# Patient Record
Sex: FEMALE | Race: OTHER | Employment: UNEMPLOYED | URBAN - METROPOLITAN AREA
[De-identification: names, ages, dates, MRNs, and addresses within clinical notes are randomized per-mention and may not be internally consistent; named-entity substitution may affect disease eponyms.]

---

## 2023-11-01 ENCOUNTER — HOSPITAL ENCOUNTER (EMERGENCY)
Age: 88
Discharge: HOME | End: 2023-11-01
Attending: EMERGENCY MEDICINE
Payer: MEDICAID

## 2023-11-01 VITALS
OXYGEN SATURATION: 100 % | DIASTOLIC BLOOD PRESSURE: 75 MMHG | SYSTOLIC BLOOD PRESSURE: 145 MMHG | HEART RATE: 66 BPM | TEMPERATURE: 98 F | RESPIRATION RATE: 16 BRPM

## 2023-11-01 DIAGNOSIS — T85.598A FEEDING TUBE BLOCKED, INITIAL ENCOUNTER: Primary | ICD-10-CM

## 2023-11-01 PROCEDURE — 99284 EMERGENCY DEPT VISIT MOD MDM: CPT

## 2023-11-01 NOTE — ED PROVIDER NOTES
[facility] Emergency Department      Pt Name: Gustavo Hines  MRN: [id number]  [address] [facility] [place] 7/2/1929  Date of evaluation: 11/1/2023  Provider: Alina Grider MD  CHIEF COMPLAINT  Chief Complaint   Patient presents with    Feeding Tube Problem     Pt cc dobhoff clogged     HPI  Gustavo Hines is a 80 y.o. female who presents because of clogged feeding tube. She is at [facility] for rehabilitation. It was noted by staff to be clogged during the nighttime. She has an NG tube present to supplement her nutrition but is also able to feed by mouth. She is at the nursing home for rehabilitation after a hip fracture. She apparently has vascular dementia and has had hospice consultation as an outpatient. REVIEW OF SYSTEMS: See HPI for further details. Remainder of review of systems limited by patient ability to provide history. Nursing notes reviewed. PAST MEDICAL HISTORY  No past medical history on file. SURGICAL HISTORY  No past surgical history on file. MEDICATIONS:  No current facility-administered medications on file prior to encounter. No current outpatient medications on file prior to encounter. ALLERGIES  Patient has no known allergies. SOCIAL HISTORY:     IMMUNIZATIONS:    There is no immunization history on file for this patient. PHYSICAL EXAM  VITAL SIGNS:  Blood pressure (!) 127/41, pulse 66, resp. rate 16, SpO2 100 %.   Constitutional:  80 y.o. female who does not appear toxic, asleep  HENT:  Atraumatic, mucous membranes moist, NG tube in place  Eyes:   Conjunctiva clear, no icterus  Neck:  Supple, no signs of injury, good ROM  Thorax & Lungs:  Respiratory effort normal, clear  Abdomen:  Non distended, soft, NT  Back:  No deformity  Extremities:  No cyanosis, no edema  Skin:  Warm, dry  Neurologic:  drowsy, nonverbal    RESULTS / MDM:  RADIOLOGY:  None     ED COURSE:  nursing staff put clog zapper into NT tube  History from:  Patient daughter  Limitations to history:

## 2023-11-01 NOTE — ED NOTES
Report back to [facility]  Daughter at bedside  Report to transport company, pt safely transferred from ER bed to transport stretcher and taken to exit     Aziza Sunshine, GAURAV  11/01/23 1000

## 2023-11-06 ENCOUNTER — HOSPITAL ENCOUNTER (EMERGENCY)
Age: 88
Discharge: HOME | End: 2023-11-06
Payer: MEDICAID

## 2023-11-06 VITALS
OXYGEN SATURATION: 98 % | HEART RATE: 83 BPM | DIASTOLIC BLOOD PRESSURE: 52 MMHG | TEMPERATURE: 98.2 F | SYSTOLIC BLOOD PRESSURE: 147 MMHG | RESPIRATION RATE: 27 BRPM

## 2023-11-06 DIAGNOSIS — T85.598A CLOGGED FEEDING TUBE: Primary | ICD-10-CM

## 2023-11-06 PROCEDURE — 99283 EMERGENCY DEPT VISIT LOW MDM: CPT

## 2023-11-06 RX ORDER — ACETAMINOPHEN 325 MG/1
325 TABLET ORAL EVERY 6 HOURS PRN
COMMUNITY
Start: 2022-08-30

## 2023-11-06 RX ORDER — ASPIRIN 81 MG/1
81 TABLET, CHEWABLE ORAL
COMMUNITY
Start: 2023-10-31

## 2023-11-06 RX ORDER — AMOXICILLIN 250 MG
1 CAPSULE ORAL 2 TIMES DAILY
COMMUNITY
Start: 2023-10-30

## 2023-11-06 RX ORDER — AMLODIPINE BESYLATE 5 MG/1
5 TABLET ORAL DAILY
COMMUNITY
Start: 2023-07-17

## 2023-11-06 RX ORDER — ATORVASTATIN CALCIUM 10 MG/1
5 TABLET, FILM COATED ORAL NIGHTLY
COMMUNITY
Start: 2023-10-30

## 2023-11-06 RX ORDER — MODAFINIL 100 MG/1
TABLET ORAL
COMMUNITY
Start: 2023-10-30 | End: 2023-11-27

## 2023-11-06 RX ORDER — METHOCARBAMOL 750 MG/1
750 TABLET, FILM COATED ORAL EVERY 6 HOURS PRN
COMMUNITY
Start: 2014-09-17

## 2023-11-06 RX ORDER — FAMOTIDINE 20 MG/1
20 TABLET, FILM COATED ORAL 2 TIMES DAILY
COMMUNITY
Start: 2023-10-30

## 2023-11-06 RX ORDER — IBUPROFEN 400 MG/1
400 TABLET ORAL 4 TIMES DAILY PRN
COMMUNITY
Start: 2018-05-02

## 2023-11-06 RX ORDER — HYDROCODONE BITARTRATE AND ACETAMINOPHEN 5; 325 MG/1; MG/1
1-2 TABLET ORAL EVERY 6 HOURS PRN
COMMUNITY
Start: 2014-09-17

## 2023-11-06 NOTE — ED NOTES
Clog buster instilled in Pt's NG tube, family member at bedside identified self as daughter stated that she was the POA and if clog buster did not work that she wanted the NG tube removed. Reynold HESTER notified of situation and also spoke with family at bedside.        Rhea Bowden RN  11/06/23 2974

## 2023-11-07 NOTE — ED PROVIDER NOTES
their note for interpretation of EKG. RADIOLOGY:   Non-plain film images such as CT, Ultrasound and MRI are read by the radiologist. Plain radiographic images are visualized and preliminarily interpreted by the ED Provider with the below findings:      Interpretation per the Radiologist below, if available at the time of this note:    No orders to display     No results found. No results found. PROCEDURES   Unless otherwise noted below, none     Procedures    CRITICAL CARE TIME (.cctime)       PAST MEDICAL HISTORY      has no past medical history on file. EMERGENCY DEPARTMENT COURSE and DIFFERENTIAL DIAGNOSIS/MDM:   Vitals:    Vitals:    11/06/23 1830 11/06/23 1845 11/06/23 1900 11/06/23 1915   BP: (!) 158/60 (!) 150/50 (!) 151/70 (!) 160/59   Pulse: 82 82 84 86   Resp: 22 25 24 23   Temp:       TempSrc:       SpO2: 97% 97% 98%        Patient was given the following medications:  Medications - No data to display          Is this patient to be included in the SEP-1 Core Measure due to severe sepsis or septic shock? No   Exclusion criteria - the patient is NOT to be included for SEP-1 Core Measure due to: Infection is not suspected    Chronic Conditions affecting care:    has no past medical history on file. CONSULTS: (Who and What was discussed)  None      Social Determinants Significantly Affecting Health : None    Records Reviewed (External and Source)  Notes    CC/HPI Summary, DDx, ED Course, and Reassessment: Patient is a 79-year-old female who presents ED with complaint of a clogged NG tube. She does do oral intake by mouth but supplements for nutrition with NG tube. Daughter reports that her appetite appears to be increasing is concerned she may no longer need the NG tube especially since has been clogged multiple times in the past week. Had lengthy discussion with daughter. Would prefer to unclog the NG tube and leave in place if needed.   Daughter is concerned she may no longer need it

## 2023-11-07 NOTE — ED NOTES
Report given to [facility] staff, no questions asked.        Annalee Bhakta, GAURAV  11/06/23 8210

## 2023-11-07 NOTE — ED NOTES
Report given to [facility], all questions answered, VSS prior to departure.      Efraín Pantoja RN  11/06/23 4035

## 2023-11-12 ENCOUNTER — HOSPITAL ENCOUNTER (EMERGENCY)
Age: 88
Discharge: HOME | End: 2023-11-12
Payer: MEDICAID

## 2023-11-12 ENCOUNTER — APPOINTMENT (OUTPATIENT)
Dept: GENERAL RADIOLOGY | Age: 88
End: 2023-11-12
Payer: MEDICAID

## 2023-11-12 VITALS
HEART RATE: 85 BPM | SYSTOLIC BLOOD PRESSURE: 138 MMHG | DIASTOLIC BLOOD PRESSURE: 61 MMHG | OXYGEN SATURATION: 95 % | RESPIRATION RATE: 18 BRPM | TEMPERATURE: 98.4 F

## 2023-11-12 DIAGNOSIS — J18.9 PNEUMONIA OF LEFT LOWER LOBE DUE TO INFECTIOUS ORGANISM: ICD-10-CM

## 2023-11-12 DIAGNOSIS — T85.598A CLOGGED FEEDING TUBE: Primary | ICD-10-CM

## 2023-11-12 PROCEDURE — 99283 EMERGENCY DEPT VISIT LOW MDM: CPT

## 2023-11-12 PROCEDURE — 49465 FLUORO EXAM OF G/COLON TUBE: CPT

## 2023-11-12 PROCEDURE — 43762 RPLC GTUBE NO REVJ TRC: CPT

## 2023-11-12 PROCEDURE — 6360000004 HC RX CONTRAST MEDICATION

## 2023-11-12 PROCEDURE — 71045 X-RAY EXAM CHEST 1 VIEW: CPT

## 2023-11-12 RX ORDER — AMOXICILLIN AND CLAVULANATE POTASSIUM 250; 62.5 MG/5ML; MG/5ML
500 POWDER, FOR SUSPENSION ORAL 3 TIMES DAILY
Qty: 300 ML | Refills: 0 | Status: SHIPPED | OUTPATIENT
Start: 2023-11-12 | End: 2023-11-22

## 2023-11-12 RX ORDER — AMOXICILLIN AND CLAVULANATE POTASSIUM 250; 62.5 MG/5ML; MG/5ML
500 POWDER, FOR SUSPENSION ORAL 3 TIMES DAILY
Qty: 300 ML | Refills: 0 | Status: SHIPPED | OUTPATIENT
Start: 2023-11-12 | End: 2023-11-12 | Stop reason: SDUPTHER

## 2023-11-12 RX ADMIN — IOHEXOL 20 ML: 350 INJECTION, SOLUTION INTRAVENOUS at 16:53

## 2023-11-12 ASSESSMENT — LIFESTYLE VARIABLES
HOW OFTEN DO YOU HAVE A DRINK CONTAINING ALCOHOL: NEVER
HOW MANY STANDARD DRINKS CONTAINING ALCOHOL DO YOU HAVE ON A TYPICAL DAY: PATIENT DOES NOT DRINK